# Patient Record
Sex: FEMALE | Race: WHITE | NOT HISPANIC OR LATINO | URBAN - METROPOLITAN AREA
[De-identification: names, ages, dates, MRNs, and addresses within clinical notes are randomized per-mention and may not be internally consistent; named-entity substitution may affect disease eponyms.]

---

## 2019-06-10 ENCOUNTER — INPATIENT (INPATIENT)
Facility: HOSPITAL | Age: 29
LOS: 0 days | Discharge: HOME | DRG: 917 | End: 2019-06-11
Payer: COMMERCIAL

## 2019-06-10 VITALS — HEART RATE: 120 BPM | SYSTOLIC BLOOD PRESSURE: 111 MMHG | DIASTOLIC BLOOD PRESSURE: 43 MMHG | OXYGEN SATURATION: 99 %
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
## 2019-06-10 DIAGNOSIS — T41.3X5A ADVERSE EFFECT OF LOCAL ANESTHETICS, INITIAL ENCOUNTER: ICD-10-CM

## 2019-06-10 LAB
ALBUMIN SERPL ELPH-MCNC: 4.3 G/DL — SIGNIFICANT CHANGE UP (ref 3.3–5)
ALP SERPL-CCNC: 77 U/L — SIGNIFICANT CHANGE UP (ref 40–120)
ALT FLD-CCNC: 125 U/L — HIGH (ref 10–45)
AMMONIA BLD-MCNC: 134 UMOL/L — HIGH (ref 11–55)
ANION GAP SERPL CALC-SCNC: 28 MMOL/L — HIGH (ref 5–17)
APAP SERPL-MCNC: <5 UG/ML — LOW (ref 10–30)
APTT BLD: 28.8 SEC — SIGNIFICANT CHANGE UP (ref 27.5–36.3)
AST SERPL-CCNC: 143 U/L — HIGH (ref 10–40)
B-OH-BUTYR SERPL-SCNC: 0.2 MMOL/L — SIGNIFICANT CHANGE UP
BASE EXCESS BLDA CALC-SCNC: -12.1 MMOL/L — LOW (ref -2–3)
BASE EXCESS BLDA CALC-SCNC: -5.5 MMOL/L — LOW (ref -2–3)
BASE EXCESS BLDV CALC-SCNC: -18.4 MMOL/L — SIGNIFICANT CHANGE UP
BASOPHILS # BLD AUTO: 0 K/UL — SIGNIFICANT CHANGE UP (ref 0–0.2)
BASOPHILS NFR BLD AUTO: 0 % — SIGNIFICANT CHANGE UP (ref 0–2)
BILIRUB SERPL-MCNC: 0.5 MG/DL — SIGNIFICANT CHANGE UP (ref 0.2–1.2)
BLD GP AB SCN SERPL QL: NEGATIVE — SIGNIFICANT CHANGE UP
BUN SERPL-MCNC: 9 MG/DL — SIGNIFICANT CHANGE UP (ref 7–23)
CA-I BLD-SCNC: 1.13 MMOL/L — SIGNIFICANT CHANGE UP (ref 1.12–1.3)
CALCIUM SERPL-MCNC: 8.5 MG/DL — SIGNIFICANT CHANGE UP (ref 8.4–10.5)
CHLORIDE SERPL-SCNC: 99 MMOL/L — SIGNIFICANT CHANGE UP (ref 96–108)
CK MB CFR SERPL CALC: 2.5 NG/ML — SIGNIFICANT CHANGE UP (ref 0–6.7)
CK SERPL-CCNC: 175 U/L — HIGH (ref 25–170)
CK SERPL-CCNC: 336 U/L — HIGH (ref 25–170)
CO2 SERPL-SCNC: 11 MMOL/L — LOW (ref 22–31)
COHGB MFR BLDA: 0.3 % — SIGNIFICANT CHANGE UP
CREAT SERPL-MCNC: 1.03 MG/DL — SIGNIFICANT CHANGE UP (ref 0.5–1.3)
EOSINOPHIL # BLD AUTO: 0.11 K/UL — SIGNIFICANT CHANGE UP (ref 0–0.5)
EOSINOPHIL NFR BLD AUTO: 0.9 % — SIGNIFICANT CHANGE UP (ref 0–6)
ETHANOL SERPL-MCNC: <10 MG/DL — SIGNIFICANT CHANGE UP (ref 0–10)
GLUCOSE BLDC GLUCOMTR-MCNC: 115 MG/DL — HIGH (ref 70–99)
GLUCOSE SERPL-MCNC: 230 MG/DL — HIGH (ref 70–99)
HCG SERPL-ACNC: <0 MIU/ML — SIGNIFICANT CHANGE UP
HCO3 BLDA-SCNC: 15 MMOL/L — LOW (ref 21–28)
HCO3 BLDA-SCNC: 15 MMOL/L — LOW (ref 21–28)
HCO3 BLDV-SCNC: 12 MMOL/L — LOW (ref 20–27)
HCT VFR BLD CALC: 40 % — SIGNIFICANT CHANGE UP (ref 34.5–45)
HGB BLD-MCNC: 12.7 G/DL — SIGNIFICANT CHANGE UP (ref 11.5–15.5)
HGB BLDA-MCNC: 11.3 G/DL — LOW (ref 11.5–15.5)
INR BLD: 0.99 — SIGNIFICANT CHANGE UP (ref 0.88–1.16)
LACTATE SERPL-SCNC: 1.2 MMOL/L — SIGNIFICANT CHANGE UP (ref 0.5–2)
LACTATE SERPL-SCNC: 17 MMOL/L — CRITICAL HIGH (ref 0.5–2)
LIDOCAIN IGE QN: 27 U/L — SIGNIFICANT CHANGE UP (ref 7–60)
LITHIUM SERPL-MCNC: <.05 MMOL/L — LOW (ref 0.6–1.2)
LYMPHOCYTES # BLD AUTO: 43.8 % — SIGNIFICANT CHANGE UP (ref 13–44)
LYMPHOCYTES # BLD AUTO: 5.44 K/UL — HIGH (ref 1–3.3)
MAGNESIUM SERPL-MCNC: 2.3 MG/DL — SIGNIFICANT CHANGE UP (ref 1.6–2.6)
MCHC RBC-ENTMCNC: 31.8 GM/DL — LOW (ref 32–36)
MCHC RBC-ENTMCNC: 31.8 PG — SIGNIFICANT CHANGE UP (ref 27–34)
MCV RBC AUTO: 100 FL — SIGNIFICANT CHANGE UP (ref 80–100)
METHGB MFR BLDA: 0.4 % — SIGNIFICANT CHANGE UP
MONOCYTES # BLD AUTO: 0.98 K/UL — HIGH (ref 0–0.9)
MONOCYTES NFR BLD AUTO: 7.9 % — SIGNIFICANT CHANGE UP (ref 2–14)
NEUTROPHILS # BLD AUTO: 5.78 K/UL — SIGNIFICANT CHANGE UP (ref 1.8–7.4)
NEUTROPHILS NFR BLD AUTO: 46.5 % — SIGNIFICANT CHANGE UP (ref 43–77)
NT-PROBNP SERPL-SCNC: 40 PG/ML — SIGNIFICANT CHANGE UP (ref 0–300)
O2 CT VFR BLDA CALC: SIGNIFICANT CHANGE UP (ref 15–23)
OSMOLALITY SERPL: 295 MOSM/KG — SIGNIFICANT CHANGE UP (ref 280–301)
OXYHGB MFR BLDA: 99 % — SIGNIFICANT CHANGE UP (ref 94–100)
PCO2 BLDA: 17 MMHG — LOW (ref 32–45)
PCO2 BLDA: 38 MMHG — SIGNIFICANT CHANGE UP (ref 32–45)
PCO2 BLDV: 46 MMHG — SIGNIFICANT CHANGE UP (ref 41–51)
PH BLDA: 7.21 — CRITICAL LOW (ref 7.35–7.45)
PH BLDA: 7.55 — HIGH (ref 7.35–7.45)
PH BLDV: 7.03 — CRITICAL LOW (ref 7.32–7.43)
PLATELET # BLD AUTO: 321 K/UL — SIGNIFICANT CHANGE UP (ref 150–400)
PO2 BLDA: 377 MMHG — HIGH (ref 83–108)
PO2 BLDA: 510 MMHG — HIGH (ref 83–108)
PO2 BLDV: 61 MMHG — SIGNIFICANT CHANGE UP
POTASSIUM SERPL-MCNC: 3.4 MMOL/L — LOW (ref 3.5–5.3)
POTASSIUM SERPL-SCNC: 3.4 MMOL/L — LOW (ref 3.5–5.3)
PROT SERPL-MCNC: 7 G/DL — SIGNIFICANT CHANGE UP (ref 6–8.3)
PROTHROM AB SERPL-ACNC: 11.2 SEC — SIGNIFICANT CHANGE UP (ref 10–12.9)
RBC # BLD: 4 M/UL — SIGNIFICANT CHANGE UP (ref 3.8–5.2)
RBC # FLD: 12.5 % — SIGNIFICANT CHANGE UP (ref 10.3–14.5)
RH IG SCN BLD-IMP: POSITIVE — SIGNIFICANT CHANGE UP
SALICYLATES SERPL-MCNC: <0.3 MG/DL — LOW (ref 2.8–20)
SAO2 % BLDA: 100 % — SIGNIFICANT CHANGE UP (ref 95–100)
SAO2 % BLDA: 100 % — SIGNIFICANT CHANGE UP (ref 95–100)
SAO2 % BLDV: 77 % — SIGNIFICANT CHANGE UP
SODIUM SERPL-SCNC: 138 MMOL/L — SIGNIFICANT CHANGE UP (ref 135–145)
TROPONIN T SERPL-MCNC: <0.01 NG/ML — SIGNIFICANT CHANGE UP (ref 0–0.01)
TSH SERPL-MCNC: 4.01 UIU/ML — SIGNIFICANT CHANGE UP (ref 0.35–4.94)
URATE SERPL-MCNC: 5.9 MG/DL — SIGNIFICANT CHANGE UP (ref 2.5–7)
VALPROATE SERPL-MCNC: <2.8 UG/ML — LOW (ref 50–100)
WBC # BLD: 12.42 K/UL — HIGH (ref 3.8–10.5)
WBC # FLD AUTO: 12.42 K/UL — HIGH (ref 3.8–10.5)

## 2019-06-10 PROCEDURE — 99223 1ST HOSP IP/OBS HIGH 75: CPT | Mod: GC

## 2019-06-10 PROCEDURE — 70450 CT HEAD/BRAIN W/O DYE: CPT | Mod: 26

## 2019-06-10 PROCEDURE — 99291 CRITICAL CARE FIRST HOUR: CPT

## 2019-06-10 PROCEDURE — 71045 X-RAY EXAM CHEST 1 VIEW: CPT | Mod: 26

## 2019-06-10 RX ORDER — I.V. FAT EMULSION 20 G/100ML
275 EMULSION INTRAVENOUS ONCE
Refills: 0 | Status: COMPLETED | OUTPATIENT
Start: 2019-06-10 | End: 2019-06-10

## 2019-06-10 RX ORDER — I.V. FAT EMULSION 20 G/100ML
900 EMULSION INTRAVENOUS ONCE
Refills: 0 | Status: DISCONTINUED | OUTPATIENT
Start: 2019-06-10 | End: 2019-06-10

## 2019-06-10 RX ORDER — HEPARIN SODIUM 5000 [USP'U]/ML
5000 INJECTION INTRAVENOUS; SUBCUTANEOUS EVERY 8 HOURS
Refills: 0 | Status: DISCONTINUED | OUTPATIENT
Start: 2019-06-10 | End: 2019-06-11

## 2019-06-10 RX ORDER — I.V. FAT EMULSION 20 G/100ML
13.75 EMULSION INTRAVENOUS ONCE
Refills: 0 | Status: DISCONTINUED | OUTPATIENT
Start: 2019-06-10 | End: 2019-06-10

## 2019-06-10 RX ORDER — FENTANYL CITRATE 50 UG/ML
25 INJECTION INTRAVENOUS ONCE
Refills: 0 | Status: DISCONTINUED | OUTPATIENT
Start: 2019-06-10 | End: 2019-06-10

## 2019-06-10 RX ORDER — KETAMINE HYDROCHLORIDE 100 MG/ML
60 INJECTION INTRAMUSCULAR; INTRAVENOUS ONCE
Refills: 0 | Status: DISCONTINUED | OUTPATIENT
Start: 2019-06-10 | End: 2019-06-10

## 2019-06-10 RX ORDER — KETAMINE HYDROCHLORIDE 100 MG/ML
50 INJECTION INTRAMUSCULAR; INTRAVENOUS ONCE
Refills: 0 | Status: DISCONTINUED | OUTPATIENT
Start: 2019-06-10 | End: 2019-06-10

## 2019-06-10 RX ORDER — ACETAMINOPHEN 500 MG
650 TABLET ORAL ONCE
Refills: 0 | Status: COMPLETED | OUTPATIENT
Start: 2019-06-10 | End: 2019-06-10

## 2019-06-10 RX ORDER — I.V. FAT EMULSION 20 G/100ML
82.5 EMULSION INTRAVENOUS ONCE
Refills: 0 | Status: COMPLETED | OUTPATIENT
Start: 2019-06-10 | End: 2019-06-10

## 2019-06-10 RX ORDER — CHLORHEXIDINE GLUCONATE 213 G/1000ML
15 SOLUTION TOPICAL EVERY 12 HOURS
Refills: 0 | Status: DISCONTINUED | OUTPATIENT
Start: 2019-06-10 | End: 2019-06-10

## 2019-06-10 RX ORDER — I.V. FAT EMULSION 20 G/100ML
60 EMULSION INTRAVENOUS ONCE
Refills: 0 | Status: DISCONTINUED | OUTPATIENT
Start: 2019-06-10 | End: 2019-06-10

## 2019-06-10 RX ORDER — SUCCINYLCHOLINE CHLORIDE 100 MG/5ML
100 SYRINGE (ML) INTRAVENOUS ONCE
Refills: 0 | Status: DISCONTINUED | OUTPATIENT
Start: 2019-06-10 | End: 2019-06-10

## 2019-06-10 RX ORDER — CHLORHEXIDINE GLUCONATE 213 G/1000ML
1 SOLUTION TOPICAL
Refills: 0 | Status: DISCONTINUED | OUTPATIENT
Start: 2019-06-10 | End: 2019-06-11

## 2019-06-10 RX ORDER — I.V. FAT EMULSION 20 G/100ML
90 EMULSION INTRAVENOUS ONCE
Refills: 0 | Status: DISCONTINUED | OUTPATIENT
Start: 2019-06-10 | End: 2019-06-10

## 2019-06-10 RX ORDER — KETAMINE HYDROCHLORIDE 100 MG/ML
20 INJECTION INTRAMUSCULAR; INTRAVENOUS ONCE
Refills: 0 | Status: DISCONTINUED | OUTPATIENT
Start: 2019-06-10 | End: 2019-06-10

## 2019-06-10 RX ORDER — I.V. FAT EMULSION 20 G/100ML
137 EMULSION INTRAVENOUS ONCE
Refills: 0 | Status: COMPLETED | OUTPATIENT
Start: 2019-06-10 | End: 2019-06-10

## 2019-06-10 RX ORDER — FENTANYL CITRATE 50 UG/ML
0.5 INJECTION INTRAVENOUS
Qty: 2500 | Refills: 0 | Status: DISCONTINUED | OUTPATIENT
Start: 2019-06-10 | End: 2019-06-10

## 2019-06-10 RX ORDER — MIDAZOLAM HYDROCHLORIDE 1 MG/ML
2 INJECTION, SOLUTION INTRAMUSCULAR; INTRAVENOUS ONCE
Refills: 0 | Status: DISCONTINUED | OUTPATIENT
Start: 2019-06-10 | End: 2019-06-10

## 2019-06-10 RX ORDER — SODIUM CHLORIDE 9 MG/ML
500 INJECTION INTRAMUSCULAR; INTRAVENOUS; SUBCUTANEOUS ONCE
Refills: 0 | Status: COMPLETED | OUTPATIENT
Start: 2019-06-10 | End: 2019-06-10

## 2019-06-10 RX ORDER — DEXMEDETOMIDINE HYDROCHLORIDE IN 0.9% SODIUM CHLORIDE 4 UG/ML
0.2 INJECTION INTRAVENOUS
Qty: 200 | Refills: 0 | Status: DISCONTINUED | OUTPATIENT
Start: 2019-06-10 | End: 2019-06-10

## 2019-06-10 RX ORDER — SUCCINYLCHOLINE CHLORIDE 100 MG/5ML
100 SYRINGE (ML) INTRAVENOUS ONCE
Refills: 0 | Status: COMPLETED | OUTPATIENT
Start: 2019-06-10 | End: 2019-06-10

## 2019-06-10 RX ORDER — PANTOPRAZOLE SODIUM 20 MG/1
40 TABLET, DELAYED RELEASE ORAL DAILY
Refills: 0 | Status: DISCONTINUED | OUTPATIENT
Start: 2019-06-10 | End: 2019-06-10

## 2019-06-10 RX ADMIN — PANTOPRAZOLE SODIUM 40 MILLIGRAM(S): 20 TABLET, DELAYED RELEASE ORAL at 12:09

## 2019-06-10 RX ADMIN — KETAMINE HYDROCHLORIDE 60 MILLIGRAM(S): 100 INJECTION INTRAMUSCULAR; INTRAVENOUS at 09:40

## 2019-06-10 RX ADMIN — MIDAZOLAM HYDROCHLORIDE 2 MILLIGRAM(S): 1 INJECTION, SOLUTION INTRAMUSCULAR; INTRAVENOUS at 11:54

## 2019-06-10 RX ADMIN — FENTANYL CITRATE 25 MICROGRAM(S): 50 INJECTION INTRAVENOUS at 09:03

## 2019-06-10 RX ADMIN — HEPARIN SODIUM 5000 UNIT(S): 5000 INJECTION INTRAVENOUS; SUBCUTANEOUS at 14:06

## 2019-06-10 RX ADMIN — CHLORHEXIDINE GLUCONATE 1 APPLICATION(S): 213 SOLUTION TOPICAL at 12:10

## 2019-06-10 RX ADMIN — Medication 650 MILLIGRAM(S): at 22:30

## 2019-06-10 RX ADMIN — Medication 650 MILLIGRAM(S): at 21:22

## 2019-06-10 RX ADMIN — KETAMINE HYDROCHLORIDE 20 MILLIGRAM(S): 100 INJECTION INTRAMUSCULAR; INTRAVENOUS at 09:26

## 2019-06-10 RX ADMIN — KETAMINE HYDROCHLORIDE 50 MILLIGRAM(S): 100 INJECTION INTRAMUSCULAR; INTRAVENOUS at 09:59

## 2019-06-10 RX ADMIN — Medication 100 MILLIGRAM(S): at 09:26

## 2019-06-10 RX ADMIN — I.V. FAT EMULSION 825 MILLILITER(S): 20 EMULSION INTRAVENOUS at 09:16

## 2019-06-10 RX ADMIN — I.V. FAT EMULSION 4950 MILLILITER(S): 20 EMULSION INTRAVENOUS at 09:15

## 2019-06-10 RX ADMIN — SODIUM CHLORIDE 2000 MILLILITER(S): 9 INJECTION INTRAMUSCULAR; INTRAVENOUS; SUBCUTANEOUS at 21:19

## 2019-06-10 RX ADMIN — FENTANYL CITRATE 2.75 MICROGRAM(S)/KG/HR: 50 INJECTION INTRAVENOUS at 09:30

## 2019-06-10 RX ADMIN — FENTANYL CITRATE 25 MICROGRAM(S): 50 INJECTION INTRAVENOUS at 09:20

## 2019-06-10 RX ADMIN — I.V. FAT EMULSION 411 MILLILITER(S): 20 EMULSION INTRAVENOUS at 10:27

## 2019-06-10 RX ADMIN — DEXMEDETOMIDINE HYDROCHLORIDE IN 0.9% SODIUM CHLORIDE 2.75 MICROGRAM(S)/KG/HR: 4 INJECTION INTRAVENOUS at 11:54

## 2019-06-10 RX ADMIN — HEPARIN SODIUM 5000 UNIT(S): 5000 INJECTION INTRAVENOUS; SUBCUTANEOUS at 21:22

## 2019-06-10 NOTE — H&P ADULT - HISTORY OF PRESENT ILLNESS
30yo  Female, no known PMHx, presenting via EMS from outpatient plastic surgery office s/p asystolic cardiac arrest after administration of Versed for status epilepticus, provoked by local Lidocaine administration. Patient presented to the office this morning and received local lidocaine into breast tissue in preparation for implant placement at around 6:40AM per Surgical PA at bedside. Following this, patient began to have Grand Mal seizures, entering status given >2-3 recurrences within 5 min period. The office called EMS who came and administered 10mg Versed IM. Patient then became apneic and bradycardic, entering asystolic cardiac arrest. ROSC was achieved in 2min. EMS brought patient to [facility] at around 8AM. Lipid Emulsion was started at 8:20AM and patient received 1/2 initial bolus. Initially, patient was not responsive and posturing, off sedation. However, with initiation of lipids, patient began to awaken and became increasingly agitated, requiring multiple sedative agents. Patient was admitted to the ICU for further management.     Per Surgical PA -   No medical or surgical history.   No known medications, including anti-depressants.   Reportedly, social alcohol use.   Patient is a smoker.

## 2019-06-10 NOTE — ED PROVIDER NOTE - PROGRESS NOTE DETAILS
Patient now awake, fighting staff, trying to self-extubate with purposeful movements and appears much more alert. Although these signs are reassuring for patient improvement in the context of recent cardiac arrest and seizures from lidocaine tox, patient needs to still be monitored and out of safety concerns, patient was sedated.    Despite fentanyl and ketamine IV, patient continues to try to self-extubate. Succinylcholine given.

## 2019-06-10 NOTE — ED ADULT NURSE REASSESSMENT NOTE - NS ED NURSE REASSESS COMMENT FT1
pt received from previous shift RN after reported seizure activity followed by cardiac arrest while undergoing a breast augmentation this morning. per PA from plastics office, ROSC in 2 minutes.  intubated with tube 27 at the lip. vital signs stable, pt on continuous cardiac monitor. 14 Occitan godwin cathter placed per MD order. pending CT scan and admission. ICU consult at bedside. pt received from previous shift RN after reported seizure activity followed by cardiac arrest while undergoing a breast augmentation this morning. per PA from plastics office, ROSC in 2 minutes.  intubated by EMS with tube 27 at the lip. vital signs stable, pt on continuous cardiac monitor. 14 Romansh godwin catheter placed per MD order. pending CT scan and admission. ICU consult at bedside. pt received from previous shift RN after reported seizure activity followed by cardiac arrest after receiving lidocaine injection in breasts while undergoing a breast augmentation this morning. per PA from plastics office, ROSC in 2 minutes.  intubated by EMS with tube 27 at the lip. pt received 10mg IM Versed by EMS prior to arrival. vital signs stable, pt on continuous cardiac monitor. 14 Faroese godwin catheter placed per MD order. pending CT scan and admission. ICU consult at bedside.

## 2019-06-10 NOTE — ED PROVIDER NOTE - CHPI ED SYMPTOMS NEG
no fever/no nausea/no abdominal pain/no chills/no abdominal distension/no confusion/no weakness/no pain/no disorientation/no vomiting

## 2019-06-10 NOTE — ED ADULT TRIAGE NOTE - ARRIVAL INFO ADDITIONAL COMMENTS
pt was having a breast procedure at a plastic surgeon office and after being injected with lidocaine she began to have grand mal seizures.  upon ems arrival pt was in status and given 10mg versed IM.  then her respirations decreased and she became bradycardic.  pt then intubated and ROSC returned within 2 mins.

## 2019-06-10 NOTE — ED ADULT NURSE REASSESSMENT NOTE - NS ED NURSE REASSESS COMMENT FT1
pt transported to CT scan and then 7 east. accompanied by respiratory therapist, 7 east resident, and RN. report given to Amando Savage. pt in stable condition at this time.

## 2019-06-10 NOTE — H&P ADULT - ASSESSMENT
30 yo F with no pmh presenting from outpatient plastic surgical office s/p witnessed grand mal seizure, cardiac arrest s/p ROSC      Neurology  #Seizure-  Patient with episode of grand mal seizure after versed administration  - vEEG monitoring for 6 hours    #Sedation  - currently intubated  - precedex gtt with goal RASS of 0 to -1  - fentanyl gtt with goal RASS of 0 to -1    Respiratory  #Hypoxic respiratory failure 2/2 cardiac arrest  - intubated  - patient alert now will attempt sedation holiday and extubation    Cardiovascular  #Cardiac arrest unclear etiology could be 2/2 lidocaine toxicity  - lipid emulsion for lidocaine toxicity  - no TTM as patient with appropriate neurological status  - no pressors as pt is not in shock    GI  #Transaminitis 2/2 transient hypoperfusion during cardiac arrest  - daily CMP    Renal  - stable, no issues    Metabolic   #Metabolic acidosis with increased anion gap. Lactate on presentation 17    #Hypokalemia  - repleted  - daily BMP    Heme/Onc  #Leukocytosis  - daily CBC    ID  - no active issues    F: none  E: replete lytes as necessary  N: NPO    Full Code   MICU

## 2019-06-10 NOTE — H&P ADULT - NSHPPHYSICALEXAM_GEN_ALL_CORE
Constitutional: Patient is intubated, intermittently stiffened posturing, non-responsive - off sedation.   HEENT: NC/AT  Neck: Supple, no JVD  Respiratory: Intubated, Lip Line 27cm. Lung sounds are vesicular b/l - no wheeze or crackles.   Cardiac: S1, S2 present, no murmurs. Tachycardic, regular.   Gastrointestinal: Soft, ND. BS+  Neurologic: Non-responsive, somnolent. Intermittent hypertonic posturing in extremities. (+) Ankle Clonus. Patellar, Ankle, and Brachioradialis Reflexes intact.   Skin: Small Incisions under breasts, slightly blood tinged.

## 2019-06-10 NOTE — ED PROVIDER NOTE - CLINICAL SUMMARY MEDICAL DECISION MAKING FREE TEXT BOX
Patient here for suspected lidocaine toxicity. Intubated, post-arrest, currently stable, with ICU at bedside having accepted pt for admission. Lipid emulsion ordered.

## 2019-06-10 NOTE — PATIENT PROFILE ADULT - STATED REASON FOR ADMISSION
S/P cardiac arrest after Lidocaine injection S/P cardiac arrest after Lidocaine injection for breast augmentation

## 2019-06-10 NOTE — CONSULT NOTE ADULT - SUBJECTIVE AND OBJECTIVE BOX
************************  Initial Consult Note   Critical Care  Attending: Dr Tracy  ************************    INTERVAL HISTORY:    28yo  Female, no known PMHx, presenting via EMS from outpatient plastic surgery office s/p asystolic cardiac arrest after administration of Versed for status epilepticus, provoked by local Lidocaine administration. Patient presented to the office this morning and received local lidocaine into breast tissue in preparation for implant placement at around 6:40AM per Surgical PA at bedside. Following this, patient began to have Grand Mal seizures, entering status given >2-3 recurrences within 5 min period. The office called EMS who came and administered 10mg Versed IM. Patient then became apneic and bradycardic, entering asystolic cardiac arrest. ROSC was achieved in 2min. EMS brought patient to [facility] at around 8AM. Lipid Emulsion was started at 8:20AM and patient received 1/2 initial bolus. Initially, patient was not responsive and posturing, off sedation. However, with initiation of lipids, patient began to awaken and became increasingly agitated, requiring multiple sedative agents. Patient was admitted to the ICU for further management.     Per Surgical PA -   No medical or surgical history.   No known medications, including anti-depressants.   Reportedly, social alcohol use.   Patient is a smoker.     REVIEW OF SYSTEMS: Unobtainable.     MEDICATIONS:  chlorhexidine 2% Cloths 1 Application(s) Topical <User Schedule>  dexmedetomidine Infusion 0.2 MICROgram(s)/kG/Hr IV Continuous <Continuous>  fat emulsion (Plant Based) 20% IVPB 137 milliLiter(s) IV Intermittent once  fentaNYL   Infusion. 0.5 MICROgram(s)/kG/Hr IV Continuous <Continuous>  heparin  Injectable 5000 Unit(s) SubCutaneous every 8 hours  midazolam Injectable 2 milliGRAM(s) IV Push once  pantoprazole  Injectable 40 milliGRAM(s) IV Push daily    VITAL SIGNS:  T(C): 36.6 (10 Blayne 2019 08:14), Max: 36.6 (10 Blayne 2019 08:14)  T(F): 97.8 (10 Blayne 2019 08:14), Max: 97.8 (10 Blayne 2019 08:14)  HR: 100 (10 Blayne 2019 10:28) (100 - 120)  BP: 126/86 (10 Blayne 2019 10:10) (104/80 - 126/86)  RR: 20 (10 Blayne 2019 10:28) (15 - 25)  SpO2: 93% (10 Blayne 2019 10:28) (93% - 100%)    PHYSICAL EXAM: [Prior to Lipid Emulsion]  Constitutional: Patient is intubated, intermittently stiffened posturing, non-responsive - off sedation.   HEENT:  Neck:  Respiratory:  Cardiac:   Gastrointestinal:  Neurologic:  Skin:     LABS:                          12.7   12.42 )-----------( 321      ( 10 Blayne 2019 08:46 )             40.0     06-10    138  |  99  |  9   ----------------------------<  230<H>  3.4<L>   |  11<L>  |  1.03    Ca    8.5      10 Blayne 2019 08:44  Mg     2.3     06-10    TPro  7.0  /  Alb  4.3  /  TBili  0.5  /  DBili  x   /  AST  143<H>  /  ALT  125<H>  /  AlkPhos  77  06-10    PT/INR - ( 10 Blayne 2019 08:44 )   PT: 11.2 sec;   INR: 0.99          PTT - ( 10 Blayne 2019 08:44 )  PTT:28.8 sec      RADIOLOGY & ADDITIONAL STUDIES:    < from: CT Head No Cont (06.10.19 @ 10:10) >  IMPRESSION:   No acute intracranial abnormality on this motion limited study. ************************  Initial Consult Note   Critical Care  Attending: Dr Tracy  ************************    INTERVAL HISTORY:    30yo  Female, no known PMHx, presenting via EMS from outpatient plastic surgery office s/p asystolic cardiac arrest after administration of Versed for status epilepticus, provoked by local Lidocaine administration. Patient presented to the office this morning and received local lidocaine into breast tissue in preparation for implant placement at around 6:40AM per Surgical PA at bedside. Following this, patient began to have Grand Mal seizures, entering status given >2-3 recurrences within 5 min period. The office called EMS who came and administered 10mg Versed IM. Patient then became apneic and bradycardic, entering asystolic cardiac arrest. ROSC was achieved in 2min. EMS brought patient to [facility] at around 8AM. Lipid Emulsion was started at 8:20AM and patient received 1/2 initial bolus. Initially, patient was not responsive and posturing, off sedation. However, with initiation of lipids, patient began to awaken and became increasingly agitated, requiring multiple sedative agents. Patient was admitted to the ICU for further management.     Per Surgical PA -   No medical or surgical history.   No known medications, including anti-depressants.   Reportedly, social alcohol use.   Patient is a smoker.     REVIEW OF SYSTEMS: Unobtainable.     MEDICATIONS:  chlorhexidine 2% Cloths 1 Application(s) Topical <User Schedule>  dexmedetomidine Infusion 0.2 MICROgram(s)/kG/Hr IV Continuous <Continuous>  fat emulsion (Plant Based) 20% IVPB 137 milliLiter(s) IV Intermittent once  fentaNYL   Infusion. 0.5 MICROgram(s)/kG/Hr IV Continuous <Continuous>  heparin  Injectable 5000 Unit(s) SubCutaneous every 8 hours  midazolam Injectable 2 milliGRAM(s) IV Push once  pantoprazole  Injectable 40 milliGRAM(s) IV Push daily    VITAL SIGNS:  T(C): 36.6 (10 Blayne 2019 08:14), Max: 36.6 (10 Blayne 2019 08:14)  T(F): 97.8 (10 Blayne 2019 08:14), Max: 97.8 (10 Blayne 2019 08:14)  HR: 100 (10 Blayne 2019 10:28) (100 - 120)  BP: 126/86 (10 Blayne 2019 10:10) (104/80 - 126/86)  RR: 20 (10 Blayne 2019 10:28) (15 - 25)  SpO2: 93% (10 Blayne 2019 10:28) (93% - 100%)    PHYSICAL EXAM: [Prior to Lipid Emulsion]  Constitutional: Patient is intubated, intermittently stiffened posturing, non-responsive - off sedation.   HEENT: NC/AT  Neck: Supple, no JVD  Respiratory: Intubated, Lip Line 27cm. Lung sounds are vesicular b/l - no wheeze or crackles.   Cardiac: S1, S2 present, no murmurs. Tachycardic, regular.   Gastrointestinal: Soft, ND. BS+  Neurologic: Non-responsive, somnolent. Intermittent hypertonic posturing in extremities. (+) Ankle Clonus. Patellar, Ankle, and Brachioradialis Reflexes intact.   Skin: Small Incisions under breasts, slightly blood tinged.     LABS:                          12.7   12.42 )-----------( 321      ( 10 Blayne 2019 08:46 )             40.0     06-10    138  |  99  |  9   ----------------------------<  230<H>  3.4<L>   |  11<L>  |  1.03    Ca    8.5      10 Blayne 2019 08:44  Mg     2.3     06-10    TPro  7.0  /  Alb  4.3  /  TBili  0.5  /  DBili  x   /  AST  143<H>  /  ALT  125<H>  /  AlkPhos  77  06-10    PT/INR - ( 10 Blayne 2019 08:44 )   PT: 11.2 sec;   INR: 0.99          PTT - ( 10 Blayne 2019 08:44 )  PTT:28.8 sec      RADIOLOGY & ADDITIONAL STUDIES:    < from: CT Head No Cont (06.10.19 @ 10:10) >  IMPRESSION:   No acute intracranial abnormality on this motion limited study.

## 2019-06-10 NOTE — ED ADULT NURSE NOTE - OBJECTIVE STATEMENT
pt arrives unresponsive, orally intubated and being bagged.  +peripheral pulses.  when not bagged pt has spont respirations.  puncture wound noted under right breast.  abd soft non distended.  lungs clear bilaterally.

## 2019-06-10 NOTE — H&P ADULT - NSHPLABSRESULTS_GEN_ALL_CORE
12.7   12.42 )-----------( 321      ( 10 Blayne 2019 08:46 )             40.0       06-10    138  |  99  |  9   ----------------------------<  230<H>  3.4<L>   |  11<L>  |  1.03    Ca    8.5      10 Blayne 2019 08:44  Mg     2.3     06-10    TPro  7.0  /  Alb  4.3  /  TBili  0.5  /  DBili  x   /  AST  143<H>  /  ALT  125<H>  /  AlkPhos  77  06-10      PT/INR - ( 10 Blayne 2019 08:44 )   PT: 11.2 sec;   INR: 0.99          PTT - ( 10 Blayne 2019 08:44 )  PTT:28.8 sec    ABG - ( 10 Blayne 2019 08:39 )  pH, Arterial: 7.21  pH, Blood: x     /  pCO2: 38    /  pO2: 377   / HCO3: 15    / Base Excess: -12.1 /  SaO2: 100             < from: CT Head No Cont (06.10.19 @ 10:10) >      IMPRESSION:     No acute intracranial abnormality on this motion limited study.    < end of copied text >

## 2019-06-10 NOTE — ED PROVIDER NOTE - OBJECTIVE STATEMENT
29F with no known PMH BIBEMS for post-cardiac arrest. Patient was at a plastic surgery clinic for procedures relating to both breasts where she received normal saline and 50cc of "either 1% or 2% lidocaine" (as per PA who administered the medications at bedside) in each breast (hence 100cc of lidocaine total), followed by  after reported seizure activity 29F with no known PMH BIBEMS for post-cardiac arrest. Patient was at a plastic surgery clinic for procedures relating to both breasts where she received normal saline and 50cc of "either 1% or 2% lidocaine" (as per PA who administered the medications at bedside) in each breast (hence 100cc of 1% or 2% lidocaine total, therefore at least 1000mg or 2000mg of lidocaine), followed by reported seizure activity 10 minutes later, after which EMS was called. EMS arrived to find patient in status epilepticus, after which they administered 10mg IM Versed. Patient then became apneic afterwards, leading to respiratory and cardiac arrest with asystole on monitors. CPR performed by EMS with intubation, after which patient obtained ROSC after a minute of resuscitation. Patient currently intubated, sinus tachycardia on monitors with normal blood pressure readings. ICU called and at bedside as of 8:15am. Lipid Emulsion ordered from pharmacy at 8:20am.

## 2019-06-10 NOTE — CONSULT NOTE ADULT - ASSESSMENT
28yo  Female, no known PMHx, presenting via EMS from outpatient plastic surgery office s/p asystolic cardiac arrest after administration of Versed for status epilepticus, provoked by local Lidocaine administration.    Asystolic Cardiac Arrest   - Patient presenting via EMS s/p asystolic cardiac arrest after Versed administration led to bradycardia and apnea.   - Consideration being given for Lidocaine Toxicity provoking both seizure and cardiac arrest as well.   - Patient was coded for approx 2min prior to achieving ROSC   - Patient intubated in the field, started on sedation after patient began to experience agitation after lipid emulsion started.   - In terms of other etiologies - no EKG changes to suggest cardiac/ischemic event (PE also low on differential), hypoxia is a consideration given patient       R/O Lidocaine Toxicity         Status Epilepticus        Anion Gap Metabolic Acidosis 2/2 Lactic Acidosis 28yo  Female, no known PMHx, presenting via EMS from outpatient plastic surgery office s/p asystolic cardiac arrest after administration of Versed for status epilepticus, provoked by local Lidocaine administration.    Asystolic Cardiac Arrest   - Patient presenting via EMS s/p asystolic cardiac arrest after Versed administration led to bradycardia and apnea.   - Consideration being given for Lidocaine Toxicity provoking both seizure and cardiac arrest as well.   - Patient was coded for approx 2min prior to achieving ROSC   - Patient intubated in the field, started on sedation after patient began to experience agitation after lipid emulsion started.   - In terms of other etiologies - no EKG changes to suggest cardiac/ischemic event (PE also low on differential), hypoxia is a consideration given patient was in status, patient certainly acidotic based on presenting VBG/ABG due lactic acidosis. Patient not hypo- or hyper-thermic on arrival.   - Continue with management of Lidocaine toxicity with lipid emulsion  - Continue on Telemetry     R/O Lidocaine Toxicity   - Lidocaine administration led to development of status epilepticus, for which patient was given Versed   -     Status Epilepticus        Anion Gap Metabolic Acidosis 2/2 Lactic Acidosis 30yo  Female, no known PMHx, presenting via EMS from outpatient plastic surgery office s/p asystolic cardiac arrest after administration of Versed for status epilepticus, provoked by local Lidocaine administration.    Asystolic Cardiac Arrest   - Patient presenting via EMS s/p asystolic cardiac arrest after Versed administration led to bradycardia and apnea.   - Consideration being given for Lidocaine Toxicity provoking both seizure and cardiac arrest as well.   - Patient was coded for approx 2min prior to achieving ROSC   - Patient intubated in the field, started on sedation after patient began to experience agitation after lipid emulsion started.   - In terms of other etiologies - no EKG changes to suggest cardiac/ischemic event (PE also low on differential), hypoxia is a consideration given patient was in status, patient certainly acidotic based on presenting VBG/ABG due lactic acidosis. Patient not hypo- or hyper-thermic on arrival.   - Continue with management of Lidocaine toxicity with lipid emulsion  - Continue on Telemetry     R/O Lidocaine Toxicity   - Lidocaine administration led to development of status epilepticus, for which patient was given Versed   - Concern present for cardiac arrest being caused by lidocaine toxicity.   - C/w lipid emulsion - bolus followed by 1 hour infusion.  - Monitor on Telemetry   - Rather than propofol for sedation, would opt for other agents given that propofol is in its own lipid emulsion and concomitant administration may dilute its effect.     Status Epilepticus  - vEEG - and epilepsy consult - patient with no known history of seizures, however, seemingly provoked by Lidocaine, which does have a propensity to lower seizure threshold.       Anion Gap Metabolic Acidosis 2/2 Lactic Acidosis   - AG 28, Lactate 17 - likely in the setting of seizures followed by asystolic cardiac arrest   - Continue with treatment of Lidocaine toxicity.   - Trend on BMP  - Trend Lactate to clearance.

## 2019-06-10 NOTE — ED PROVIDER NOTE - CRITICAL CARE PROVIDED
consultation with other physicians/consult w/ pt's family directly relating to pts condition/direct patient care (not related to procedure)/conducted a detailed discussion of DNR status/interpretation of diagnostic studies/additional history taking/documentation

## 2019-06-11 ENCOUNTER — TRANSCRIPTION ENCOUNTER (OUTPATIENT)
Age: 29
End: 2019-06-11

## 2019-06-11 VITALS
DIASTOLIC BLOOD PRESSURE: 78 MMHG | SYSTOLIC BLOOD PRESSURE: 112 MMHG | RESPIRATION RATE: 25 BRPM | HEART RATE: 84 BPM | OXYGEN SATURATION: 100 %

## 2019-06-11 LAB
ANION GAP SERPL CALC-SCNC: 8 MMOL/L — SIGNIFICANT CHANGE UP (ref 5–17)
BASOPHILS # BLD AUTO: 0.02 K/UL — SIGNIFICANT CHANGE UP (ref 0–0.2)
BASOPHILS NFR BLD AUTO: 0.3 % — SIGNIFICANT CHANGE UP (ref 0–2)
BUN SERPL-MCNC: 6 MG/DL — LOW (ref 7–23)
CALCIUM SERPL-MCNC: 8 MG/DL — LOW (ref 8.4–10.5)
CHLORIDE SERPL-SCNC: 111 MMOL/L — HIGH (ref 96–108)
CO2 SERPL-SCNC: 20 MMOL/L — LOW (ref 22–31)
CREAT SERPL-MCNC: 0.68 MG/DL — SIGNIFICANT CHANGE UP (ref 0.5–1.3)
EOSINOPHIL # BLD AUTO: 0.01 K/UL — SIGNIFICANT CHANGE UP (ref 0–0.5)
EOSINOPHIL NFR BLD AUTO: 0.1 % — SIGNIFICANT CHANGE UP (ref 0–6)
GLUCOSE SERPL-MCNC: 102 MG/DL — HIGH (ref 70–99)
HCT VFR BLD CALC: 30.8 % — LOW (ref 34.5–45)
HGB BLD-MCNC: 10 G/DL — LOW (ref 11.5–15.5)
IMM GRANULOCYTES NFR BLD AUTO: 0.4 % — SIGNIFICANT CHANGE UP (ref 0–1.5)
LYMPHOCYTES # BLD AUTO: 1.8 K/UL — SIGNIFICANT CHANGE UP (ref 1–3.3)
LYMPHOCYTES # BLD AUTO: 23.9 % — SIGNIFICANT CHANGE UP (ref 13–44)
MAGNESIUM SERPL-MCNC: 1.9 MG/DL — SIGNIFICANT CHANGE UP (ref 1.6–2.6)
MCHC RBC-ENTMCNC: 31 PG — SIGNIFICANT CHANGE UP (ref 27–34)
MCHC RBC-ENTMCNC: 32.5 GM/DL — SIGNIFICANT CHANGE UP (ref 32–36)
MCV RBC AUTO: 95.4 FL — SIGNIFICANT CHANGE UP (ref 80–100)
MONOCYTES # BLD AUTO: 0.38 K/UL — SIGNIFICANT CHANGE UP (ref 0–0.9)
MONOCYTES NFR BLD AUTO: 5 % — SIGNIFICANT CHANGE UP (ref 2–14)
NEUTROPHILS # BLD AUTO: 5.29 K/UL — SIGNIFICANT CHANGE UP (ref 1.8–7.4)
NEUTROPHILS NFR BLD AUTO: 70.3 % — SIGNIFICANT CHANGE UP (ref 43–77)
NRBC # BLD: 0 /100 WBCS — SIGNIFICANT CHANGE UP (ref 0–0)
PLATELET # BLD AUTO: 173 K/UL — SIGNIFICANT CHANGE UP (ref 150–400)
POTASSIUM SERPL-MCNC: 3.8 MMOL/L — SIGNIFICANT CHANGE UP (ref 3.5–5.3)
POTASSIUM SERPL-SCNC: 3.8 MMOL/L — SIGNIFICANT CHANGE UP (ref 3.5–5.3)
RBC # BLD: 3.23 M/UL — LOW (ref 3.8–5.2)
RBC # FLD: 13.2 % — SIGNIFICANT CHANGE UP (ref 10.3–14.5)
SODIUM SERPL-SCNC: 139 MMOL/L — SIGNIFICANT CHANGE UP (ref 135–145)
WBC # BLD: 7.53 K/UL — SIGNIFICANT CHANGE UP (ref 3.8–10.5)
WBC # FLD AUTO: 7.53 K/UL — SIGNIFICANT CHANGE UP (ref 3.8–10.5)

## 2019-06-11 PROCEDURE — 82607 VITAMIN B-12: CPT

## 2019-06-11 PROCEDURE — 84550 ASSAY OF BLOOD/URIC ACID: CPT

## 2019-06-11 PROCEDURE — 80053 COMPREHEN METABOLIC PANEL: CPT

## 2019-06-11 PROCEDURE — 83735 ASSAY OF MAGNESIUM: CPT

## 2019-06-11 PROCEDURE — 86850 RBC ANTIBODY SCREEN: CPT

## 2019-06-11 PROCEDURE — 82553 CREATINE MB FRACTION: CPT

## 2019-06-11 PROCEDURE — 80164 ASSAY DIPROPYLACETIC ACD TOT: CPT

## 2019-06-11 PROCEDURE — 83690 ASSAY OF LIPASE: CPT

## 2019-06-11 PROCEDURE — 84702 CHORIONIC GONADOTROPIN TEST: CPT

## 2019-06-11 PROCEDURE — 85025 COMPLETE CBC W/AUTO DIFF WBC: CPT

## 2019-06-11 PROCEDURE — 85610 PROTHROMBIN TIME: CPT

## 2019-06-11 PROCEDURE — 80307 DRUG TEST PRSMV CHEM ANLYZR: CPT

## 2019-06-11 PROCEDURE — 83930 ASSAY OF BLOOD OSMOLALITY: CPT

## 2019-06-11 PROCEDURE — 80176 ASSAY OF LIDOCAINE: CPT

## 2019-06-11 PROCEDURE — 93306 TTE W/DOPPLER COMPLETE: CPT

## 2019-06-11 PROCEDURE — 84443 ASSAY THYROID STIM HORMONE: CPT

## 2019-06-11 PROCEDURE — 82803 BLOOD GASES ANY COMBINATION: CPT

## 2019-06-11 PROCEDURE — 99291 CRITICAL CARE FIRST HOUR: CPT

## 2019-06-11 PROCEDURE — 99239 HOSP IP/OBS DSCHRG MGMT >30: CPT | Mod: GC

## 2019-06-11 PROCEDURE — 95951: CPT | Mod: 26

## 2019-06-11 PROCEDURE — 71045 X-RAY EXAM CHEST 1 VIEW: CPT

## 2019-06-11 PROCEDURE — 82010 KETONE BODYS QUAN: CPT

## 2019-06-11 PROCEDURE — 80048 BASIC METABOLIC PNL TOTAL CA: CPT

## 2019-06-11 PROCEDURE — 82550 ASSAY OF CK (CPK): CPT

## 2019-06-11 PROCEDURE — 82140 ASSAY OF AMMONIA: CPT

## 2019-06-11 PROCEDURE — 83605 ASSAY OF LACTIC ACID: CPT

## 2019-06-11 PROCEDURE — 84484 ASSAY OF TROPONIN QUANT: CPT

## 2019-06-11 PROCEDURE — 82746 ASSAY OF FOLIC ACID SERUM: CPT

## 2019-06-11 PROCEDURE — 85018 HEMOGLOBIN: CPT

## 2019-06-11 PROCEDURE — 93306 TTE W/DOPPLER COMPLETE: CPT | Mod: 26

## 2019-06-11 PROCEDURE — 82330 ASSAY OF CALCIUM: CPT

## 2019-06-11 PROCEDURE — 70450 CT HEAD/BRAIN W/O DYE: CPT

## 2019-06-11 PROCEDURE — 95951: CPT

## 2019-06-11 PROCEDURE — 86900 BLOOD TYPING SEROLOGIC ABO: CPT

## 2019-06-11 PROCEDURE — 85730 THROMBOPLASTIN TIME PARTIAL: CPT

## 2019-06-11 PROCEDURE — 36415 COLL VENOUS BLD VENIPUNCTURE: CPT

## 2019-06-11 PROCEDURE — 82962 GLUCOSE BLOOD TEST: CPT

## 2019-06-11 PROCEDURE — 80178 ASSAY OF LITHIUM: CPT

## 2019-06-11 PROCEDURE — 86901 BLOOD TYPING SEROLOGIC RH(D): CPT

## 2019-06-11 PROCEDURE — 83880 ASSAY OF NATRIURETIC PEPTIDE: CPT

## 2019-06-11 RX ORDER — NICOTINE POLACRILEX 2 MG
1 GUM BUCCAL DAILY
Refills: 0 | Status: DISCONTINUED | OUTPATIENT
Start: 2019-06-11 | End: 2019-06-11

## 2019-06-11 RX ORDER — SODIUM CHLORIDE 9 MG/ML
500 INJECTION, SOLUTION INTRAVENOUS ONCE
Refills: 0 | Status: DISCONTINUED | OUTPATIENT
Start: 2019-06-11 | End: 2019-06-11

## 2019-06-11 RX ORDER — SODIUM CHLORIDE 9 MG/ML
1000 INJECTION INTRAMUSCULAR; INTRAVENOUS; SUBCUTANEOUS ONCE
Refills: 0 | Status: COMPLETED | OUTPATIENT
Start: 2019-06-11 | End: 2019-06-11

## 2019-06-11 RX ORDER — ACETAMINOPHEN 500 MG
650 TABLET ORAL ONCE
Refills: 0 | Status: COMPLETED | OUTPATIENT
Start: 2019-06-11 | End: 2019-06-11

## 2019-06-11 RX ADMIN — SODIUM CHLORIDE 2000 MILLILITER(S): 9 INJECTION INTRAMUSCULAR; INTRAVENOUS; SUBCUTANEOUS at 02:30

## 2019-06-11 RX ADMIN — Medication 650 MILLIGRAM(S): at 07:08

## 2019-06-11 RX ADMIN — Medication 650 MILLIGRAM(S): at 08:00

## 2019-06-11 RX ADMIN — Medication 1 PATCH: at 09:17

## 2019-06-11 RX ADMIN — CHLORHEXIDINE GLUCONATE 1 APPLICATION(S): 213 SOLUTION TOPICAL at 07:09

## 2019-06-11 RX ADMIN — HEPARIN SODIUM 5000 UNIT(S): 5000 INJECTION INTRAVENOUS; SUBCUTANEOUS at 07:08

## 2019-06-11 NOTE — EEG REPORT - NS EEG TEXT BOX
[facility] Department of Neurology  Inpatient Continuous Video Electroencephalography Report    Patient Name:	NORMA ROBBINS    :	1990  MRN:	[id number]    Study Start Date/Time:  6/10/2019, 11:41:39 AM  Study End Date/Time:	    Referred by: Eunice Tracy MD    Brief Clinical History:  NORMA ROBBINS is a 28 yo F with no pmh presenting from outpatient plastic surgical office s/p witnessed grand mal seizure, cardiac arrest s/p ROSC    Diagnosis Code:   R56.9 convulsions/seizure  CPT: 47979 vEEG 12-24 hours    Acquisition Details:  Electroencephalography was acquired using a minimum of 21 channels on an [employer] system v 8.5.1 with electrode placement according to the standard International 10-20 system following ACNS (American Clinical Neurophysiology Society) guidelines for Long-Term Video EEG monitoring.  Anterior temporal T1 and T2 electrodes were utilized whenever possible. The [employer] automated spike & seizure detections were all reviewed in detail, in addition to extensive portions of raw EEG.    Day 1: 6/10/2019 @ 11:41:39 AM to next morning @ 0700  Pertinent medications: Versed  Background   Symmetry: No persistent asymmetries of voltage or frequency.  Frequencies: Predominantly alpha/beta  Anterior-posterior (AP) gradient: Present.  Posterior Dominant Rhythm: 11 Hz symmetric, well-organized, and well-modulated.  Voltage:  Normal (most activity >20 uV).  Continuity: continuous,   Variability: Yes. 						  Reactivity: Yes.  Breach: No.  N2 sleep: Symmetric spindles and K complexes.  Epileptiform discharges:  No epileptiform discharges.  Abnormal periodic/rhythmic activity: None.  Events:  No electrographic seizures or paroxysmal clinical events.  Provocations: Hyperventilation and photic were not performed  Other findings: None.  ECG: Normal sinus rhythm    Daily summary and impression:  Excess diffuse beta activity which is a normal variant and may also be seen with certain sedative medications (e.g. benzodiazepines).  There were no electrographic seizures, epileptiform discharges, or paroxysmal clinical events.      Read by:  Annalise Grant MD

## 2019-06-11 NOTE — DISCHARGE NOTE NURSING/CASE MANAGEMENT/SOCIAL WORK - NSDCDPATPORTLINK_GEN_ALL_CORE
You can access the [facility], offered by [facility], by registering with the following website: [email or web address]

## 2019-06-11 NOTE — DISCHARGE NOTE PROVIDER - HOSPITAL COURSE
28yo  Female, no known PMHx, presenting via EMS from outpatient plastic surgery office s/p asystolic cardiac arrest after administration of Versed for status epilepticus, provoked by local Lidocaine administration. Patient presented to the office this morning and received local lidocaine into breast tissue in preparation for implant placement at around 6:40AM per Surgical PA at bedside. Following this, patient began to have Grand Mal seizures, entering status given >2-3 recurrences within 5 min period. The office called EMS who came and administered 10mg Versed IM. Patient then became apneic and bradycardic, entering asystolic cardiac arrest. ROSC was achieved in 2min. EMS brought patient to [facility] at around 8AM. Lipid Emulsion was started at 8:20AM and patient received 1/2 initial bolus. Initially, patient was not responsive and posturing, off sedation. However, with initiation of lipids, patient began to awaken and became increasingly agitated, requiring multiple sedative agents. Patient was admitted to the ICU for further management.

## 2019-06-11 NOTE — DISCHARGE NOTE PROVIDER - NSDCCPCAREPLAN_GEN_ALL_CORE_FT
PRINCIPAL DISCHARGE DIAGNOSIS  Diagnosis: Lidocaine overdose, accidental or unintentional, sequela  Assessment and Plan of Treatment: You were admitted to the hospital because during your procedure you had an adverse reaction to lidocaine or another anesthetic which cause you to have a seizure. Your heart then stopped and you underwent CPR. Your heart started to beat spontaneously again and you were treated with lipid emulsion. Please follow up with your PCP as soon as possible.      SECONDARY DISCHARGE DIAGNOSES  Diagnosis: Cardiac arrest  Assessment and Plan of Treatment: During your operation after you recieved anesthetic your heart stopped and you underwent CPR. Your heart began to beat spontaneously. We performed an ultrasound of your heart    Diagnosis: Seizure  Assessment and Plan of Treatment: During your procedure you had a seizure that was witnessed. We performed EEG which records electrical activity of your brain. While your were being monitored, there was no seizure activity.

## 2019-06-11 NOTE — DISCHARGE NOTE PROVIDER - PROVIDER TOKENS
FREE:[LAST:[Sully],FIRST:[Erum],PHONE:[phone]],FAX:[(   )    -],ADDRESS:[Primary Office  [address]  [address]  [place], [place] [address]]]

## 2019-06-11 NOTE — PROGRESS NOTE ADULT - SUBJECTIVE AND OBJECTIVE BOX
NORMA ROBBINS 29y Female    Interval HPI:      Patient seen and examined at bedside:    T(C): 36.9 (06-11-19 @ 06:09), Max: 37.1 (06-10-19 @ 10:20)  HR: 72 (06-11-19 @ 08:00) (52 - 118)  BP: 86/52 (06-11-19 @ 08:00) (64/35 - 126/86)  RR: 18 (06-11-19 @ 08:00) (8 - 24)  SpO2: 99% (06-11-19 @ 08:00) (93% - 100%)    Review of systems negative except as mentioned above    chlorhexidine 2% Cloths 1 Application(s) Topical <User Schedule>  heparin  Injectable 5000 Unit(s) SubCutaneous every 8 hours      Physical Exam:    GENERAL:  HEENT:  NECK:  RESPIRATORY:  CV:  GI:  EXT:  MSK:    Labs:                        10.0   7.53  )-----------( 173      ( 11 Jun 2019 06:37 )             30.8     06-11    139  |  111<H>  |  6<L>  ----------------------------<  102<H>  3.8   |  20<L>  |  0.68    Ca    8.0<L>      11 Jun 2019 06:37  Mg     1.9     06-11    TPro  7.0  /  Alb  4.3  /  TBili  0.5  /  DBili  x   /  AST  143<H>  /  ALT  125<H>  /  AlkPhos  77  06-10    PT/INR - ( 10 Blayne 2019 08:44 )   PT: 11.2 sec;   INR: 0.99          PTT - ( 10 Blayne 2019 08:44 )  PTT:28.8 sec    ABG - ( 10 Blayne 2019 13:21 )  pH, Arterial: 7.55  pH, Blood: x     /  pCO2: 17    /  pO2: 510   / HCO3: 15    / Base Excess: -5.5  /  SaO2: x                 CAPILLARY BLOOD GLUCOSE      POCT Blood Glucose.: 115 mg/dL (10 Blayne 2019 11:31)            Imaging:

## 2019-06-11 NOTE — DISCHARGE NOTE PROVIDER - NSDCCAREPROVSEEN_GEN_ALL_CORE_FT
Eunice Tracy Eunice Tracy A  Patient seen and examined with house-staff during bedside rounds.  Resident note read, including vitals, physical findings, laboratory data, and radiological reports.   Revisions included below.  Direct personal management at bed side and extensive interpretation of the data.  Plan was outlined and discussed in details with the housestaff.  Decision making of high complexity  Action taken for acute disease activity to reflect the level of care provided:  - medication reconciliation  - review laboratory data  she is stable   ECHO normal  EEG negative  No orthostasis  neurologically stable  discussed the condiiton in details with patient and father.  Most likely related to lidocaine toxicity methhemoglobin was normal

## 2019-06-11 NOTE — DISCHARGE NOTE PROVIDER - CARE PROVIDER_API CALL
Erum Virk  Primary Office  [facility]  [address].  [address]  [place], [place] [address]  Phone: [phone]  Fax: (   )    -  Follow Up Time:

## 2019-06-12 PROBLEM — Z00.00 ENCOUNTER FOR PREVENTIVE HEALTH EXAMINATION: Status: ACTIVE | Noted: 2019-06-12

## 2019-06-12 LAB
FOLATE SERPL-MCNC: 8.6 NG/ML — SIGNIFICANT CHANGE UP
LIDOCAIN SERPL-MCNC: 2.9 MCG/ML — SIGNIFICANT CHANGE UP (ref 1.5–5)
VIT B12 SERPL-MCNC: 616 PG/ML — SIGNIFICANT CHANGE UP (ref 232–1245)

## 2019-06-13 LAB — LIDOCAIN SERPL-MCNC: 14.7 MCG/ML — HIGH (ref 1.5–5)

## 2019-06-14 DIAGNOSIS — I46.9 CARDIAC ARREST, CAUSE UNSPECIFIED: ICD-10-CM

## 2019-06-14 DIAGNOSIS — T42.4X5A ADVERSE EFFECT OF BENZODIAZEPINES, INITIAL ENCOUNTER: ICD-10-CM

## 2019-06-14 DIAGNOSIS — E87.6 HYPOKALEMIA: ICD-10-CM

## 2019-06-14 DIAGNOSIS — G40.801 OTHER EPILEPSY, NOT INTRACTABLE, WITH STATUS EPILEPTICUS: ICD-10-CM

## 2019-06-14 DIAGNOSIS — E87.2 ACIDOSIS: ICD-10-CM

## 2019-06-14 DIAGNOSIS — T88.59XA OTHER COMPLICATIONS OF ANESTHESIA, INITIAL ENCOUNTER: ICD-10-CM

## 2019-06-14 DIAGNOSIS — J96.01 ACUTE RESPIRATORY FAILURE WITH HYPOXIA: ICD-10-CM

## 2019-06-14 DIAGNOSIS — T41.3X1A POISONING BY LOCAL ANESTHETICS, ACCIDENTAL (UNINTENTIONAL), INITIAL ENCOUNTER: ICD-10-CM

## 2019-06-14 DIAGNOSIS — R74.0 NONSPECIFIC ELEVATION OF LEVELS OF TRANSAMINASE AND LACTIC ACID DEHYDROGENASE [LDH]: ICD-10-CM

## 2019-06-14 PROBLEM — Z78.9 OTHER SPECIFIED HEALTH STATUS: Chronic | Status: ACTIVE | Noted: 2019-06-10

## 2019-06-21 ENCOUNTER — APPOINTMENT (OUTPATIENT)
Dept: PULMONOLOGY | Facility: CLINIC | Age: 29
End: 2019-06-21
Payer: COMMERCIAL

## 2019-06-21 VITALS
DIASTOLIC BLOOD PRESSURE: 60 MMHG | OXYGEN SATURATION: 98 % | BODY MASS INDEX: 17.77 KG/M2 | TEMPERATURE: 98 F | HEIGHT: 69 IN | RESPIRATION RATE: 12 BRPM | WEIGHT: 120 LBS | SYSTOLIC BLOOD PRESSURE: 100 MMHG | HEART RATE: 64 BPM

## 2019-06-21 VITALS
BODY MASS INDEX: 17.77 KG/M2 | SYSTOLIC BLOOD PRESSURE: 100 MMHG | OXYGEN SATURATION: 98 % | HEART RATE: 69 BPM | WEIGHT: 120 LBS | TEMPERATURE: 97.6 F | HEIGHT: 69 IN | DIASTOLIC BLOOD PRESSURE: 60 MMHG

## 2019-06-21 DIAGNOSIS — T41.3X1D: ICD-10-CM

## 2019-06-21 DIAGNOSIS — J96.01 ACUTE RESPIRATORY FAILURE WITH HYPOXIA: ICD-10-CM

## 2019-06-21 DIAGNOSIS — R56.9 UNSPECIFIED CONVULSIONS: ICD-10-CM

## 2019-06-21 DIAGNOSIS — Z87.891 PERSONAL HISTORY OF NICOTINE DEPENDENCE: ICD-10-CM

## 2019-06-21 PROCEDURE — 99215 OFFICE O/P EST HI 40 MIN: CPT

## 2019-06-21 RX ORDER — PROPRANOLOL HYDROCHLORIDE 10 MG/1
10 TABLET ORAL 3 TIMES DAILY
Refills: 0 | Status: ACTIVE | COMMUNITY
Start: 2019-06-21

## 2019-06-21 NOTE — HISTORY OF PRESENT ILLNESS
[Cough] : denies coughing [Feelings Of Weakness On Exertion] : denies exercise intolerance [Difficulty Breathing During Exertion] : denies dyspnea on exertion [Wheezing] : denies wheezing [Chest Pain Or Discomfort] : denies chest pain [Regional Soft Tissue Swelling Both Lower Extremities] : denies lower extremity edema [Fever] : denies fever [Wt Gain ___ Lbs] : no recent weight gain [Wt Loss ___ Lbs] : no recent weight loss [0  -  Nothing at all] : 0, nothing at all [Oxygen] : the patient uses no supplemental oxygen [More Frequent Use Needed Recently] : Patient reports no recent increase in frequency of [Class I - No Symptoms and No Limitations] : I [Good Control] : peak flow has been poor [Former] : is a former smoker [None] : None [Adherent] : the patient is adherent with ~his/her~ medication regimen [Goals--Doing Well] : the patient is doing well with ~his/her~ goals [Side Effects] : ~He/She~ denies medication side effects [FreeTextEntry1] : She is following up after she was discharged from the hospital. 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 She is not short of breath. She is sometimes feels stressed about the whole experience. Her sleeping is adequate. Her appetite is adequate.

## 2019-06-21 NOTE — PHYSICAL EXAM
[General Appearance - Well Developed] : well developed [Normal Appearance] : normal appearance [General Appearance - Well Nourished] : well nourished [Well Groomed] : well groomed [General Appearance - In No Acute Distress] : no acute distress [No Deformities] : no deformities [Eyelids - No Xanthelasma] : the eyelids demonstrated no xanthelasmas [Normal Conjunctiva] : the conjunctiva exhibited no abnormalities [Neck Appearance] : the appearance of the neck was normal [Neck Cervical Mass (___cm)] : no neck mass was observed [Normal Oropharynx] : normal oropharynx [Thyroid Diffuse Enlargement] : the thyroid was not enlarged [Thyroid Nodule] : there were no palpable thyroid nodules [Jugular Venous Distention Increased] : there was no jugular-venous distention [Heart Rate And Rhythm] : heart rate and rhythm were normal [Heart Sounds] : normal S1 and S2 [Murmurs] : no murmurs present [Respiration, Rhythm And Depth] : normal respiratory rhythm and effort [Exaggerated Use Of Accessory Muscles For Inspiration] : no accessory muscle use [Abdomen Soft] : soft [Auscultation Breath Sounds / Voice Sounds] : lungs were clear to auscultation bilaterally [Abdomen Tenderness] : non-tender [Abdomen Mass (___ Cm)] : no abdominal mass palpated [Abnormal Walk] : normal gait [Gait - Sufficient For Exercise Testing] : the gait was sufficient for exercise testing [Petechial Hemorrhages (___cm)] : no petechial hemorrhages [Nail Clubbing] : no clubbing of the fingernails [Cyanosis, Localized] : no localized cyanosis [Skin Color & Pigmentation] : normal skin color and pigmentation [Deep Tendon Reflexes (DTR)] : deep tendon reflexes were 2+ and symmetric [Skin Turgor] : normal skin turgor [] : no rash [No Focal Deficits] : no focal deficits [Sensation] : the sensory exam was normal to light touch and pinprick [Impaired Insight] : insight and judgment were intact [Oriented To Time, Place, And Person] : oriented to person, place, and time [Affect] : the affect was normal

## 2019-06-21 NOTE — ASSESSMENT
[FreeTextEntry1] : The patient developed complication from lidocaine. The patient was scheduled for breast augmentation surgery. 10 mL of lidocaine was injected in both areas were total of 10 mL. Patient developed seizure and became unresponsive. Patient had cardiac arrest requiring intubation. Patient was admitted to ICU. Patient's mental status improved. EEG was negative for seizure. Chest x-ray was unremarkable. CT scan of the head was negative. Patient was extubated. Echocardiogram was normal. The lidocaine level was elevated at 14 with a range between 0-5. The patient was discharged. Patient is clinically stable with no neurological deficit. Patient is hemodynamically stable with no recurrence of seizure. The seizure was related to lidocaine toxicity. I discussed her condition in detail. I provided her workup is all the chest x-ray, echocardiogram, and CT scan of the head. Patient will be traveling to [place] in the middle of July. Patient most like sleep experiencing posttraumatic distress syndrome. I explained to patient that her condition is stable and she might seek psychological evaluation or her support when she goes back to [place]. Patient will contact me if there is any change in her condition.